# Patient Record
Sex: FEMALE | Race: WHITE | URBAN - METROPOLITAN AREA
[De-identification: names, ages, dates, MRNs, and addresses within clinical notes are randomized per-mention and may not be internally consistent; named-entity substitution may affect disease eponyms.]

---

## 2017-09-22 ENCOUNTER — APPOINTMENT (RX ONLY)
Dept: URBAN - METROPOLITAN AREA CLINIC 142 | Facility: CLINIC | Age: 77
Setting detail: DERMATOLOGY
End: 2017-09-22

## 2017-09-22 DIAGNOSIS — L29.8 OTHER PRURITUS: ICD-10-CM

## 2017-09-22 DIAGNOSIS — L30.8 OTHER SPECIFIED DERMATITIS: ICD-10-CM

## 2017-09-22 DIAGNOSIS — L29.89 OTHER PRURITUS: ICD-10-CM

## 2017-09-22 PROBLEM — L30.9 DERMATITIS, UNSPECIFIED: Status: ACTIVE | Noted: 2017-09-22

## 2017-09-22 PROBLEM — E78.5 HYPERLIPIDEMIA, UNSPECIFIED: Status: ACTIVE | Noted: 2017-09-22

## 2017-09-22 PROBLEM — K21.9 GASTRO-ESOPHAGEAL REFLUX DISEASE WITHOUT ESOPHAGITIS: Status: ACTIVE | Noted: 2017-09-22

## 2017-09-22 PROBLEM — I10 ESSENTIAL (PRIMARY) HYPERTENSION: Status: ACTIVE | Noted: 2017-09-22

## 2017-09-22 PROBLEM — E13.9 OTHER SPECIFIED DIABETES MELLITUS WITHOUT COMPLICATIONS: Status: ACTIVE | Noted: 2017-09-22

## 2017-09-22 PROCEDURE — ? PRESCRIPTION

## 2017-09-22 PROCEDURE — ? COUNSELING

## 2017-09-22 PROCEDURE — 99203 OFFICE O/P NEW LOW 30 MIN: CPT

## 2017-09-22 RX ORDER — TRIAMCINOLONE ACETONIDE 1 MG/G
CREAM TOPICAL BID
Qty: 1 | Refills: 3 | Status: ERX | COMMUNITY
Start: 2017-09-22

## 2017-09-22 RX ADMIN — TRIAMCINOLONE ACETONIDE: 1 CREAM TOPICAL at 14:03

## 2017-09-22 ASSESSMENT — LOCATION SIMPLE DESCRIPTION DERM
LOCATION SIMPLE: RIGHT LOWER BACK
LOCATION SIMPLE: LEFT PRETIBIAL REGION
LOCATION SIMPLE: LEFT FOREARM
LOCATION SIMPLE: RIGHT PRETIBIAL REGION
LOCATION SIMPLE: RIGHT FOREARM
LOCATION SIMPLE: LEFT LOWER BACK
LOCATION SIMPLE: RIGHT POSTERIOR UPPER ARM

## 2017-09-22 ASSESSMENT — LOCATION DETAILED DESCRIPTION DERM
LOCATION DETAILED: RIGHT PROXIMAL POSTERIOR UPPER ARM
LOCATION DETAILED: LEFT SUPERIOR MEDIAL MIDBACK
LOCATION DETAILED: RIGHT VENTRAL PROXIMAL FOREARM
LOCATION DETAILED: LEFT VENTRAL PROXIMAL FOREARM
LOCATION DETAILED: LEFT PROXIMAL PRETIBIAL REGION
LOCATION DETAILED: RIGHT DISTAL PRETIBIAL REGION
LOCATION DETAILED: RIGHT SUPERIOR MEDIAL MIDBACK

## 2017-09-22 ASSESSMENT — LOCATION ZONE DERM
LOCATION ZONE: TRUNK
LOCATION ZONE: LEG
LOCATION ZONE: ARM

## 2017-10-09 ENCOUNTER — APPOINTMENT (RX ONLY)
Dept: URBAN - METROPOLITAN AREA CLINIC 142 | Facility: CLINIC | Age: 77
Setting detail: DERMATOLOGY
End: 2017-10-09

## 2017-10-09 DIAGNOSIS — L50.1 IDIOPATHIC URTICARIA: ICD-10-CM

## 2017-10-09 DIAGNOSIS — L29.89 OTHER PRURITUS: ICD-10-CM

## 2017-10-09 DIAGNOSIS — L29.8 OTHER PRURITUS: ICD-10-CM

## 2017-10-09 PROBLEM — L30.9 DERMATITIS, UNSPECIFIED: Status: ACTIVE | Noted: 2017-10-09

## 2017-10-09 PROCEDURE — ? BIOPSY BY PUNCH METHOD

## 2017-10-09 PROCEDURE — ? OTHER

## 2017-10-09 PROCEDURE — 99213 OFFICE O/P EST LOW 20 MIN: CPT | Mod: 25

## 2017-10-09 PROCEDURE — ? PRESCRIPTION

## 2017-10-09 PROCEDURE — 11100: CPT

## 2017-10-09 PROCEDURE — ? COUNSELING

## 2017-10-09 RX ORDER — HYDROXYZINE HYDROCHLORIDE 10 MG/1
TABLET, FILM COATED ORAL
Qty: 90 | Refills: 1 | Status: ERX | COMMUNITY
Start: 2017-10-09

## 2017-10-09 RX ADMIN — HYDROXYZINE HYDROCHLORIDE: 10 TABLET, FILM COATED ORAL at 15:18

## 2017-10-09 ASSESSMENT — LOCATION DETAILED DESCRIPTION DERM
LOCATION DETAILED: LEFT ANTERIOR PROXIMAL UPPER ARM
LOCATION DETAILED: EPIGASTRIC SKIN

## 2017-10-09 ASSESSMENT — LOCATION ZONE DERM
LOCATION ZONE: ARM
LOCATION ZONE: TRUNK

## 2017-10-09 ASSESSMENT — LOCATION SIMPLE DESCRIPTION DERM
LOCATION SIMPLE: ABDOMEN
LOCATION SIMPLE: LEFT UPPER ARM

## 2017-10-09 NOTE — PROCEDURE: OTHER
Detail Level: Zone
Note Text (......Xxx Chief Complaint.): This diagnosis correlates with the
Other (Free Text): Labs to be drawn CBC WITH DIFF, CMP, TSH, SPEP

## 2017-10-09 NOTE — PROCEDURE: BIOPSY BY PUNCH METHOD
Post-Care Instructions: I reviewed with the patient in detail post-care instructions. Patient is to keep the biopsy site dry overnight, and then apply bacitracin twice daily until healed. Patient may apply hydrogen peroxide soaks to remove any crusting.
Notification Instructions: Patient will be notified of biopsy results. However, patient instructed to call the office if not contacted within 2 weeks.
Hemostasis: None
X Size Of Lesion In Cm (Optional): 0
Wound Care: Vaseline
Detail Level: Detailed
Biopsy Type: H and E
Patient Will Remove Sutures At Home?: No
Billing Type: Third-Party Bill
Anesthesia Volume In Cc (Will Not Render If 0): 3
Home Suture Removal Text: Patient was provided a home suture removal kit and will remove their sutures at home.  If they have any questions or difficulties they will call the office.
Dressing: pressure dressing with telfa
Consent: Written consent was obtained and risks were reviewed including but not limited to scarring, infection, bleeding, scabbing, incomplete removal, nerve damage and allergy to anesthesia.
Lab Facility: [id number]
Epidermal Sutures: gel foam
Anesthesia Type: 1% lidocaine without epinephrine
Lab: [id number]
Punch Size In Mm: 4

## 2018-01-19 ENCOUNTER — APPOINTMENT (RX ONLY)
Dept: URBAN - METROPOLITAN AREA CLINIC 142 | Facility: CLINIC | Age: 78
Setting detail: DERMATOLOGY
End: 2018-01-19

## 2018-01-19 DIAGNOSIS — L30.8 OTHER SPECIFIED DERMATITIS: ICD-10-CM

## 2018-01-19 PROCEDURE — ? COUNSELING

## 2018-01-19 PROCEDURE — 99214 OFFICE O/P EST MOD 30 MIN: CPT

## 2018-01-19 PROCEDURE — ? PRESCRIPTION

## 2018-01-19 PROCEDURE — ? TREATMENT REGIMEN

## 2018-01-19 RX ORDER — IVERMECTIN 3 MG/1
TABLET ORAL
Qty: 12 | Refills: 0 | Status: ERX | COMMUNITY
Start: 2018-01-19

## 2018-01-19 RX ADMIN — IVERMECTIN: 3 TABLET ORAL at 17:13

## 2018-01-19 ASSESSMENT — LOCATION DETAILED DESCRIPTION DERM: LOCATION DETAILED: SUBXIPHOID

## 2018-01-19 ASSESSMENT — LOCATION ZONE DERM: LOCATION ZONE: TRUNK

## 2018-01-19 ASSESSMENT — LOCATION SIMPLE DESCRIPTION DERM: LOCATION SIMPLE: ABDOMEN

## 2019-12-23 ENCOUNTER — HOSPITAL ENCOUNTER (OUTPATIENT)
Dept: HOSPITAL 61 - PCVCCLINIC | Age: 79
Discharge: HOME | End: 2019-12-23
Attending: INTERNAL MEDICINE
Payer: COMMERCIAL

## 2019-12-23 DIAGNOSIS — Z90.710: ICD-10-CM

## 2019-12-23 DIAGNOSIS — Z79.899: ICD-10-CM

## 2019-12-23 DIAGNOSIS — Z72.89: ICD-10-CM

## 2019-12-23 DIAGNOSIS — Z79.4: ICD-10-CM

## 2019-12-23 DIAGNOSIS — Z90.49: ICD-10-CM

## 2019-12-23 DIAGNOSIS — M19.90: ICD-10-CM

## 2019-12-23 DIAGNOSIS — E66.9: ICD-10-CM

## 2019-12-23 DIAGNOSIS — R42: ICD-10-CM

## 2019-12-23 DIAGNOSIS — K21.9: ICD-10-CM

## 2019-12-23 DIAGNOSIS — Z79.84: ICD-10-CM

## 2019-12-23 DIAGNOSIS — E78.00: ICD-10-CM

## 2019-12-23 DIAGNOSIS — I10: ICD-10-CM

## 2019-12-23 DIAGNOSIS — R07.9: Primary | ICD-10-CM

## 2019-12-23 DIAGNOSIS — R53.83: ICD-10-CM

## 2019-12-23 DIAGNOSIS — Z87.891: ICD-10-CM

## 2019-12-23 DIAGNOSIS — R06.02: ICD-10-CM

## 2019-12-23 DIAGNOSIS — E10.69: ICD-10-CM

## 2019-12-23 PROCEDURE — 93005 ELECTROCARDIOGRAM TRACING: CPT

## 2019-12-23 PROCEDURE — G0463 HOSPITAL OUTPT CLINIC VISIT: HCPCS

## 2019-12-23 PROCEDURE — 36415 COLL VENOUS BLD VENIPUNCTURE: CPT

## 2019-12-23 PROCEDURE — 80061 LIPID PANEL: CPT

## 2019-12-24 ENCOUNTER — HOSPITAL ENCOUNTER (OUTPATIENT)
Dept: HOSPITAL 61 - PCVCIMAG | Age: 79
Discharge: HOME | End: 2019-12-24
Attending: INTERNAL MEDICINE
Payer: COMMERCIAL

## 2019-12-24 DIAGNOSIS — I10: Primary | ICD-10-CM

## 2019-12-24 DIAGNOSIS — E11.9: ICD-10-CM

## 2019-12-24 DIAGNOSIS — R55: ICD-10-CM

## 2019-12-24 DIAGNOSIS — R06.02: ICD-10-CM

## 2019-12-24 DIAGNOSIS — Z87.891: ICD-10-CM

## 2019-12-24 DIAGNOSIS — E78.5: ICD-10-CM

## 2019-12-24 PROCEDURE — A9500 TC99M SESTAMIBI: HCPCS

## 2019-12-24 PROCEDURE — 78452 HT MUSCLE IMAGE SPECT MULT: CPT

## 2019-12-24 PROCEDURE — 93306 TTE W/DOPPLER COMPLETE: CPT

## 2019-12-24 PROCEDURE — 93017 CV STRESS TEST TRACING ONLY: CPT

## 2019-12-25 NOTE — PCVCIMAG
--------------- APPROVED REPORT --------------





Study performed:  12/24/2019 08:06:48



EXAM: Comprehensive 2D, Doppler, and color-flow 

Echocardiogram

Patient Location: Echo lab

Room #:  2Status:  routine



BSA:         2.12

HR: 93 bpmBP:          140/76 mmHg

Rhythm: NSR



Other Information 

Study Quality: Adequate



Risk Factors: 

Cardiac Risk Factors:  HTN, Hyperlipidemia, 

DM



Indications

Diabetes

Dyspnea 

Fatigue 

Chest Pain

Hypertension/HDD



2D Dimensions

IVSd:  11.31 (7-11mm)LVOT Diam:  22.13 (18-24mm) 

LVDd:  38.10 mm

PWd:  12.48 (7-11mm)Ascending Ao:  32.06 (22-36mm)

LVDs:  23.60 (25-40mm)

Left Atrium:  38.58 (27-40mm)

Aortic Root:  25.37 mm

LV Single Plane 4CH:  55.90 %

LV Single Plane 2CH:  56.79 %

Biplane EF:  57.0 %



Volumes

Left Atrial Volume (Systole)

Single Plane 4CH:  57.76 mLSingle Plane 2CH:  32.75 mL

Biplane LA Volume:  47.00 mLLA ESV Index:  22.00 mL/m2



Aortic Valve

AoV Peak Naga.:  0.98 m/s

AO Peak Gr.:  3.85 mmHgLVOT Max PG:  3.05 mmHg

LVOT Max V:  0.87 m/s

ANETTE Vmax: 3.42 cm2



Mitral Valve

E/A Ratio:  0.4

MV Decel. Time:  78.42 ms

MV E Max Naga.:  0.36 m/s

MV A Naga.:  0.80 m/s

MV PHT:  33.15 ms

MVA (PHT):  6.64 cm2

IVRT:  173.01 ms



TDI

E/Lateral E':  12.00E/Medial E':  12.00

Medial E' Naga.:  0.03 m/s

Lateral E' Naga.:  0.03 m/s



Pulmonary Valve

PV Peak Naga.:  0.84 m/sPV Peak Gr.:  2.79 mmHg



Pulmonary Vein

P Vein S:    0.70 m/sP Vein A:  0.63 m/s

P Vein D:   0.35 m/sP Vein A Dur.:  93.4 msec

P Vein S/D Ratio:  2.00



Tricuspid Valve

TR Peak Naga.:  2.37 m/s

TR Peak Gr.:  22.49 mmHg

TV Vmax:  0.53 m/sPA Pressure:  29.00 mmHg



Left Ventricle

The left ventricle is normal size. There is normal LV segmental wall 

motion. Mild concentric left ventricular hypertrophy. Left 

ventricular systolic function is normal. The left ventricular 

ejection fraction is within the normal range. LVEF is 55-60%. Grade I 

- abnormal relaxation pattern. Findings suggest the left atrial 

pressure is elevated.



Right Ventricle

The right ventricle is normal size. The right ventricular systolic 

function is normal.



Atria

The left atrium size is normal. The right atrium size is 

normal.



Aortic Valve

Aortic valve is trileaflet. The aortic valve is normal in structure 

and function. No aortic regurgitation is present. There is no aortic 

valvular stenosis.



Mitral Valve

The anterior mitral valve leaflet is moderately thickened and 

hyperechoic but opens well. Trace mitral regurgitation. No evidence 

of mitral valve stenosis.



Tricuspid Valve

The tricuspid valve is normal in structure. Trace tricuspid 

regurgitation. Borderline pulmonary hypertension with a PA pressure 

of 29 mmHg.



Pulmonic Valve

The pulmonary valve is normal in structure. There is no pulmonic 

valvular regurgitation.



Great Vessels

The aortic root is normal in size. The ascending aorta is normal in 

size. Aortic arch is normal in caliber. IVC is normal in size and 

collapses >50% with inspiration.



Pericardium

There is no pericardial effusion. There is no pleural 

effusion.



<Conclusion>

The left ventricle is normal size.

Mild concentric left ventricular hypertrophy.

LVEF is 55-60%.

Grade I - abnormal relaxation pattern.

Findings suggest the left atrial pressure is elevated.

The right ventricle is normal size.

The left atrium size is normal.

Aortic valve is trileaflet.

The aortic valve is normal in structure and function.

The anterior mitral valve leaflet is moderately thickened and 

hyperechoic but opens well.

Trace mitral regurgitation.

Trace tricuspid regurgitation.

Borderline pulmonary hypertension with a PA pressure of 29 mmHg.

The aortic root is normal in size.

There is no pericardial effusion.

## 2019-12-25 NOTE — PCVCIMAG
--------------- APPROVED REPORT --------------





Imaging Protocol: Rest Tc-99m/Stress Tc-99m 1 day

Study performed:  12/24/2019 09:13:10



Indication: CP Progressive, SOA, Pre-Syncope

Patient Location: Out-Patient

Stress Nurse: Radha Garza RN, Sugar Berumen RN

NM Tech:Leonid GISSELLE PolancoMT



Ht: 5 ft 7 in Wt: 223 lbs BSA:  2.12 m2

HR: 88 bpm                      BP: 180/91 mmHg         BMI:  

34.9

Rhythm:  Sinus Rhythm



Medical History

Medical History: Age, DM (insulin), Former Tobacco User

Medications: Simvastatin, Micardis

Allergies: No known drug allergies



Resting Data

Rest SPECT myocardial perfusion imaging was performed in supine 

position 45 minutes following the intravenous injection of 10.8 mCi 

of Tc-99m Sestamibi.

Time of rest injection: 0905     Date: 12/24/2019

Administration Route: IV

Administration Site: Left Hand



Pharmacologic Stress

Pharmacologic stress test was performed by injecting Regadenoson 0.4 

mg IV push over 10-15 seconds immediately followed by the intravenous 

injection of 32.2 mCi of Tc-99m Sestamibi.

Time of stress injection: 1030     Date: 12/24/2019

Administration Route: IV

Administration Site: Left Hand

Gated Stress SPECT was performed 45 minutes after stress 

injection.

The images were gated to evaluate regional wall motion and calculate 

left ventricular ejection fraction. 



Stress Test Details

Stress Test:  Pharmacologic stress testing performed using 0.4 mg of 

regadenoson per 5 mL given IV over 10 seconds.

  Reason for pharmacologic stress test: arthritis.



HRMax Heart Rate (APMHR): 142 bpm 

Resting HR:            88 bpmTarget HR (85% APMHR): 120 bpm

Max HR Achieved:  117 bpm

% of APMHR:         82

Recovery HR:            104 bpm



BP

Resting BP:  180/91 mmHg

Max BP:       148/55 mmHg

Recovery BP:       177/82 mmHg

ECG

Resting ECG:  Sinus Rhythm

Stress ECG:     Sinus Tachycardia

Arrhythmia:    None

Recovery ECG: Sinus Tachycardia



Clinical

Reason for Termination: Completed protocol

Stress Symptoms: Dyspnea, Headache, Fatigue

Symptoms resolved during recovery.



Stress ECG Conclusion

ECG: Non-ischemic



Study Quality

Study: Good



Study Data

Post stress, the left ventricular ejection was 70%..

SSS: 0

SRS: 1

SDS: 0

TID = 1.00.



Perfusion

No evidence of stress induced ischemia or prior myocardial 

infarction.



Wall Motion

Normal left ventricular size and function with no regional wall 

motion abnormalities.



Nuclear Conclusion

No evidence of stress induced ischemia or prior myocardial 

infarction.

Normal left ventricular size and function with no regional wall 

motion abnormalities.

Post stress, the left ventricular ejection was 70%. 

No prior study available for comparison.



Interpreted by:  Mikey Franklin MD

Electronically Approved: 12/24/2019 

16:49:09



<Conclusion>

ECG: Non-ischemic